# Patient Record
Sex: MALE | Race: WHITE | NOT HISPANIC OR LATINO | URBAN - NONMETROPOLITAN AREA
[De-identification: names, ages, dates, MRNs, and addresses within clinical notes are randomized per-mention and may not be internally consistent; named-entity substitution may affect disease eponyms.]

---

## 2023-03-20 ENCOUNTER — OFFICE (OUTPATIENT)
Dept: URBAN - NONMETROPOLITAN AREA CLINIC 5 | Facility: CLINIC | Age: 23
End: 2023-03-20

## 2023-03-20 VITALS
HEART RATE: 73 BPM | DIASTOLIC BLOOD PRESSURE: 67 MMHG | SYSTOLIC BLOOD PRESSURE: 132 MMHG | RESPIRATION RATE: 18 BRPM | HEIGHT: 74 IN | WEIGHT: 213 LBS

## 2023-03-20 DIAGNOSIS — R10.32 LEFT LOWER QUADRANT PAIN: ICD-10-CM

## 2023-03-20 DIAGNOSIS — K58.9 IRRITABLE BOWEL SYNDROME WITHOUT DIARRHEA: ICD-10-CM

## 2023-03-20 DIAGNOSIS — R10.11 RIGHT UPPER QUADRANT PAIN: ICD-10-CM

## 2023-03-20 PROCEDURE — 99204 OFFICE O/P NEW MOD 45 MIN: CPT | Performed by: INTERNAL MEDICINE

## 2023-03-20 NOTE — SERVICEHPINOTES
Jl Perez   is a   24 yo  male  with a past medical history of GERD who presents to establish care for abdominal pain in his right upper quadrant and left upper quadrant, GERD, and change in bowel habits..   Patient was seen in the ER on 12/17/2022 for abdominal pain at which time his blood work revealed WBC 7.8, hemoglobin 15.9, platelets 204, sodium 141, potassium 4.5, chloride 104, CO2 30, BUN 13, creatinine 0.91, T bili 1.0, AST 36, ALT 61, alk phos 60, total protein 8.2, and albumin 4.5.  He had a CT  appendix protocol at that time which was normal. Patient had follow up blood work on 01/27/2023 in Dr. Koo office which revealed WBC 7, hemoglobin 14.7, platelets 154, sodium 139, potassium 4.0, chloride 101, CO2 29, BUN 10, creatinine 0.8, T bili 1.1, AST 21, ALT 32, alk-phos 65, total protein 8.3, albumin of 5.1.  Patient reports today that he has abdominal pain in his left upper quadrant which radiates to his right upper quadrant. This started approximately 6 months ago.  He followed up with his primary care physician in January at which time he was referred here.  He was seen in the ER in December for the pain as above.  He does report he had acid reflux quite frequently for 1-2 years but  this has been resolved now for quite some time.  He denies any dysphagia, odynophagia, weight loss, vomiting, bright red blood per rectum, melena,or rectal pain.  He does have intermittent nausea.  He was having diarrhea more frequently but since recently walking more while on vacation this resolved.  He is now moving his bowels 1-2 times daily with formed stools.  He denies any nocturnal BMs or urgency with bowel movements.  He does report his abdominal pain improves after a bowel movement.  He does report  2 isolated episodes of stool leakage approximately 3 months ago.  This has not occurred again.  He denies smoking, EtOH use, illicit drug use, or NSAID use.  His only medication is a multivitamin daily.  He is from [place] and is studying criminal justice in hopes to become a .  He has never attempted soluble fiber.  He has not been on Bentyl or ib Wilmer.  He has not had any stool studies.

## 2023-03-20 NOTE — SERVICENOTES
Given patient's bilateral upper quadrant pain which does improve after a bowel movement have counseled him on a possible component of IBS.  He has had normal cross-sectional imaging and blood work (aside from thrombocytopenia in Dr. Koo office).  Counseled him to attempt soluble fiber for regularity of the bowels.  Will also attempt ib Wilmer.  Will prescribe Bentyl.  Will plan to see back in clinic in 6 weeks and if with persistent symptoms will plan for colonoscopy.  Will also obtain a fecal calprotectin at this time to rule out inflammatory component.  If it is elevated will proceed with colonoscopy now.

## 2023-05-04 ENCOUNTER — OFFICE (OUTPATIENT)
Dept: URBAN - NONMETROPOLITAN AREA CLINIC 5 | Facility: CLINIC | Age: 23
End: 2023-05-04

## 2023-05-04 VITALS
HEIGHT: 74 IN | HEART RATE: 88 BPM | DIASTOLIC BLOOD PRESSURE: 81 MMHG | RESPIRATION RATE: 18 BRPM | SYSTOLIC BLOOD PRESSURE: 130 MMHG | WEIGHT: 211 LBS

## 2023-05-04 DIAGNOSIS — K58.9 IRRITABLE BOWEL SYNDROME WITHOUT DIARRHEA: ICD-10-CM

## 2023-05-04 DIAGNOSIS — R10.32 LEFT LOWER QUADRANT PAIN: ICD-10-CM

## 2023-05-04 PROCEDURE — 99214 OFFICE O/P EST MOD 30 MIN: CPT | Performed by: INTERNAL MEDICINE

## 2023-05-04 NOTE — SERVICENOTES
Given patient's continued left upper quadrant abdominal pain will plan for cross-sectional imaging the CT abdomen.   counseled patient continue on fiber and Bentyl.  If with change in symptoms, weight loss, change in bowel habits etc. he will let us know and would plan for colonoscopy.

## 2023-09-06 ENCOUNTER — OFFICE (OUTPATIENT)
Dept: URBAN - NONMETROPOLITAN AREA CLINIC 5 | Facility: CLINIC | Age: 23
End: 2023-09-06

## 2023-09-06 VITALS
SYSTOLIC BLOOD PRESSURE: 126 MMHG | WEIGHT: 215 LBS | HEART RATE: 61 BPM | DIASTOLIC BLOOD PRESSURE: 84 MMHG | RESPIRATION RATE: 18 BRPM | HEIGHT: 74 IN

## 2023-09-06 DIAGNOSIS — R10.11 RIGHT UPPER QUADRANT PAIN: ICD-10-CM

## 2023-09-06 DIAGNOSIS — K58.9 IRRITABLE BOWEL SYNDROME WITHOUT DIARRHEA: ICD-10-CM

## 2023-09-06 DIAGNOSIS — R10.32 LEFT LOWER QUADRANT PAIN: ICD-10-CM

## 2023-09-06 PROCEDURE — 99213 OFFICE O/P EST LOW 20 MIN: CPT | Performed by: INTERNAL MEDICINE

## 2023-09-06 NOTE — SERVICENOTES
Patient reports he is doing well today off the Bentyl and fiber.  Recommend he follow up in clinic as needed.

## 2023-09-06 NOTE — SERVICEHPINOTES
Jl Perez   is a   23   year old  male    male with a past medical history of GERD who presents for follow up of abdominal pain in his right upper quadrant and left upper quadrant, GERD, and change in bowel habits..   Patient was seen in the ER on 12/17/2022 for abdominal pain at which time his blood work revealed WBC 7.8, hemoglobin 15.9, platelets 204, sodium 141, potassium 4.5, chloride 104, CO2 30, BUN 13, creatinine 0.91, T bili 1.0, AST 36, ALT 61, alk phos 60, total protein 8.2, and albumin 4.5.  He had a CT  appendix protocol at that time which was normal. Patient had follow up blood work on 01/27/2023 in Dr. Koo office which revealed WBC 7, hemoglobin 14.7, platelets 154, sodium 139, potassium 4.0, chloride 101, CO2 29, BUN 10, creatinine 0.8, T bili 1.1, AST 21, ALT 32, alk-phos 65, total protein 8.3, albumin of 5.1.  Patient reported that his abdominal pain in his left upper quadrant which radiated to his right upper quadrant started approximately 6 months prior.  He followed up with his primary care physician in January 2023 at which time he was referred here.  He was seen in the ER in December for the pain as above.  He reported acid reflux quite frequently for 1-2 years but this had been resolved now for quite some time.  He denied any dysphagia, odynophagia, weight loss, vomiting, bright red blood per rectum, melena,or rectal pain.  He did report intermittent nausea.  He was having diarrhea more frequently but since returning from vacation this resolved.  He iwas moving his bowels 1-2 times daily with formed stools.  He denied any nocturnal BMs or urgency with bowel movements.  He did report his abdominal pain improved after a bowel movement.  He is from [place] and is studying criminal justice in hopes to become a .    Patient was counseled on soluble fiber and attempting Bentyl. He had a fecal calprotectin which was unremarkable. Patient at follow up visit reported he continued with occasional left upper quadrant discomfort which occurred every couple days and would last for a few seconds. He also continued on the Bentyl which did abort the symptoms. He reported his bowel movements were regular and he did not believe there was any association with the pain and his bowel movements. He denied any bright red blood per rectum, melena, dysphagia, odynophagia, nocturnal bowel movements, change in bowel movements, or weight loss. Patient has not had any cross-sectional imaging.
br
br   Patient after last visit completed a CT scan which was unremarkable.  Patient reports today he is doing well.  He denies any abdominal pain, nausea, vomiting, weight loss, diarrhea, constipation, hematemesis, bright blood per rectum, or melena.  He is no longer requiring Bentyl.  He is no longer taking fiber supplement.